# Patient Record
Sex: MALE | Race: BLACK OR AFRICAN AMERICAN
[De-identification: names, ages, dates, MRNs, and addresses within clinical notes are randomized per-mention and may not be internally consistent; named-entity substitution may affect disease eponyms.]

---

## 2021-10-02 ENCOUNTER — HOSPITAL ENCOUNTER (EMERGENCY)
Dept: HOSPITAL 61 - ER | Age: 36
Discharge: HOME | End: 2021-10-02
Payer: SELF-PAY

## 2021-10-02 VITALS — DIASTOLIC BLOOD PRESSURE: 104 MMHG | SYSTOLIC BLOOD PRESSURE: 171 MMHG

## 2021-10-02 VITALS — HEIGHT: 72 IN | WEIGHT: 315 LBS | BODY MASS INDEX: 42.66 KG/M2

## 2021-10-02 DIAGNOSIS — Z87.891: ICD-10-CM

## 2021-10-02 DIAGNOSIS — G89.11: ICD-10-CM
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
## 2021-10-02 DIAGNOSIS — M25.521: Primary | ICD-10-CM
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
## 2021-10-02 PROCEDURE — 99283 EMERGENCY DEPT VISIT LOW MDM: CPT

## 2021-10-02 PROCEDURE — 90715 TDAP VACCINE 7 YRS/> IM: CPT

## 2021-10-02 PROCEDURE — 99284 EMERGENCY DEPT VISIT MOD MDM: CPT

## 2021-10-02 PROCEDURE — 90471 IMMUNIZATION ADMIN: CPT

## 2021-10-02 PROCEDURE — 73090 X-RAY EXAM OF FOREARM: CPT

## 2021-10-02 PROCEDURE — 73060 X-RAY EXAM OF HUMERUS: CPT

## 2021-10-02 PROCEDURE — A4565 SLINGS: HCPCS

## 2021-10-02 NOTE — ED.ADGEN
Past Medical History


Past Medical History:  No Pertinent History


Past Surgical History:  Appendectomy


Smoking Status:  Former Smoker


Alcohol Use:  None


Drug Use:  None





General Adult


EDM:


Chief Complaint:  UPPER EXTREMITY INJURY





HPI:


HPI:





Patient is a 36  year old male coming in for right elbow pain after a fall.  J

ust prior to arrival patient was at a fast food restaurant when he slipped on 

the floor and fell backwards landing on his right elbow.  Patient is left-

handed.  No other injuries.  Otherwise has been well.  Complaining of tingling 

in his fingers on his right





Review of Systems:


Review of Systems:


All other systems within normal limits except for as noted in the HPI





Current Medications:





Current Medications








 Medications


  (Trade)  Dose


 Ordered  Sig/Marino  Start Time


 Stop Time Status Last Admin


Dose Admin


 


 Acetaminophen/


 Hydrocodone Bitart


  (Lortab 5/325)  1 tab  1X  ONCE  10/2/21 18:00


 10/2/21 18:01   





 


 Diphtheria/


 Tetanus/Acell


 Pertussis


  (ADACEL TDap


 SYRINGE)  0.5 ml  ONCE ONCE  10/2/21 18:00


 10/2/21 18:01   





 


 Ibuprofen


  (Motrin)  800 mg  1X  ONCE  10/2/21 17:30


 10/2/21 17:31 DC 10/2/21 17:34


800 MG











Allergies:


Allergies:





Allergies








Coded Allergies Type Severity Reaction Last Updated Verified


 


  No Known Drug Allergies    9/14/15 No











Physical Exam:


PE:





Constitutional: Well developed, well nourished, no acute distress, non-toxic 

appearance. []


HENT: Normocephalic, atraumatic, bilateral external ears normal, oropharynx 

moist, no oral exudates, nose normal. []


Eyes: PERRLA, EOMI, conjunctiva normal, no discharge. [] 


Neck: Normal range of motion, no tenderness, supple, no stridor. [] 


Cardiovascular:Heart rate regular rhythm, no murmur []


Lungs & Thorax:  Bilateral breath sounds clear to auscultation []


Abdomen: Bowel sounds normal, soft, no tenderness, no masses, no pulsatile 

masses. [] 


Skin: Warm, dry, no erythema, no rash. [] 


Back: No tenderness, no CVA tenderness. [] 


Extremities: No tenderness, no cyanosis, no clubbing, ROM intact, no edema. [] 


Neurologic: Alert and oriented X 3, normal motor function, normal sensory f

unction, no focal deficits noted. []


Psychologic: Affect normal, judgement normal, mood normal. []





Current Patient Data:


Vital Signs:





                                   Vital Signs








  Date Time  Temp Pulse Resp B/P (MAP) Pulse Ox O2 Delivery O2 Flow Rate FiO2


 


10/2/21 17:04 97.5  17 171/101 (124) 98 Room Air  





 97.5       











EKG:


EKG:


[]





Heart Score:


C/O Chest Pain:  No


Risk Factors:


Risk Factors:  DM, Current or recent (<one month) smoker, HTN, HLP, family 

history of CAD, obesity.


Risk Scores:


Score 0 - 3:  2.5% MACE over next 6 weeks - Discharge Home


Score 4 - 6:  20.3% MACE over next 6 weeks - Admit for Clinical Observation


Score 7 - 10:  72.7% MACE over next 6 weeks - Early Invasive Strategies





Radiology/Procedures:


Radiology/Procedures:


X-ray of right humerus and forearm EP interpretation: Small chip-like fracture 

of the right coronoid process.  []





Course & Med Decision Making:


Course & Med Decision Making


Pertinent Labs and Imaging studies reviewed. (See chart for details)


Given sling and strict follow-up, tetanus updated.


[]





Dragon Disclaimer:


Dragon Disclaimer:


This electronic medical record was generated, in whole or in part, using a voice

 recognition dictation system.





Departure


Departure


Impression:  


   Primary Impression:  


   Elbow fracture, right


Disposition:  01 HOME / SELF CARE / HOMELESS


Condition:  STABLE


Referrals:  


NO PCP (PCP)








MAKAYLA FORTUNE DO


Patient Instructions:  Arm Sling Use, Easy-to-Read





Additional Instructions:  


Follow-up with orthopedics, Dr. Fortune at:


Scripts


Hydrocodone Bit/Acetaminophen (HYDROCODONE-APAP 5-325  **) 1 Tab Tablet


1 TAB PO PRN Q6HRS PRN for PAIN for 3 Days, #10 TAB 0 Refills


   Prov: KRISHNA AGUIRRE MD         10/2/21











KRISHNA AGUIRRE MD             Oct 2, 2021 17:54

## 2021-10-02 NOTE — RAD
EXAMINATION: Right forearm and right humerus radiographs.



VIEWS: 2 views of the right humerus and 2 views of the right forearm.



COMPARISON: None



INDICATION:36 years, Male, pain, fall.



FINDINGS: 

No acute fracture, dislocation or subluxation. No bone erosion or periosteal reaction. Posterior olec
ranon enthesophyte. No soft tissue swelling or joint effusion.



IMPRESSION:

No acute osseous process in the right humerus or forearm.



Electronically signed by: Jerel Do MD (10/2/2021 6:20 PM) JASON

## 2022-02-15 ENCOUNTER — HOSPITAL ENCOUNTER (EMERGENCY)
Dept: HOSPITAL 61 - ER | Age: 37
Discharge: HOME | End: 2022-02-15
Payer: SELF-PAY

## 2022-02-15 VITALS — SYSTOLIC BLOOD PRESSURE: 157 MMHG | DIASTOLIC BLOOD PRESSURE: 94 MMHG

## 2022-02-15 VITALS — WEIGHT: 250.45 LBS | HEIGHT: 72 IN | BODY MASS INDEX: 33.92 KG/M2

## 2022-02-15 DIAGNOSIS — Z87.891: ICD-10-CM

## 2022-02-15 DIAGNOSIS — K46.9: Primary | ICD-10-CM

## 2022-02-15 LAB
ALBUMIN SERPL-MCNC: 3.2 G/DL (ref 3.4–5)
ALBUMIN/GLOB SERPL: 0.6 {RATIO} (ref 1–1.7)
ALP SERPL-CCNC: 97 U/L (ref 46–116)
ALT SERPL-CCNC: 29 U/L (ref 16–63)
ANION GAP SERPL CALC-SCNC: 7 MMOL/L (ref 6–14)
APTT PPP: YELLOW S
AST SERPL-CCNC: 23 U/L (ref 15–37)
BACTERIA #/AREA URNS HPF: 0 /HPF
BASOPHILS # BLD AUTO: 0.1 X10^3/UL (ref 0–0.2)
BASOPHILS NFR BLD: 1 % (ref 0–3)
BILIRUB SERPL-MCNC: 0.3 MG/DL (ref 0.2–1)
BILIRUB UR QL STRIP: NEGATIVE
BUN SERPL-MCNC: 8 MG/DL (ref 8–26)
BUN/CREAT SERPL: 7 (ref 6–20)
CALCIUM SERPL-MCNC: 8.5 MG/DL (ref 8.5–10.1)
CHLORIDE SERPL-SCNC: 107 MMOL/L (ref 98–107)
CO2 SERPL-SCNC: 28 MMOL/L (ref 21–32)
CREAT SERPL-MCNC: 1.1 MG/DL (ref 0.7–1.3)
EOSINOPHIL NFR BLD: 0.3 X10^3/UL (ref 0–0.7)
EOSINOPHIL NFR BLD: 3 % (ref 0–3)
ERYTHROCYTE [DISTWIDTH] IN BLOOD BY AUTOMATED COUNT: 16.8 % (ref 11.5–14.5)
FIBRINOGEN PPP-MCNC: CLEAR MG/DL
GFR SERPLBLD BASED ON 1.73 SQ M-ARVRAT: 91.6 ML/MIN
GLUCOSE SERPL-MCNC: 97 MG/DL (ref 70–99)
HCT VFR BLD CALC: 39.5 % (ref 39–53)
HGB BLD-MCNC: 12.6 G/DL (ref 13–17.5)
LYMPHOCYTES # BLD: 3.9 X10^3/UL (ref 1–4.8)
LYMPHOCYTES NFR BLD AUTO: 48 % (ref 24–48)
MCH RBC QN AUTO: 25 PG (ref 25–35)
MCHC RBC AUTO-ENTMCNC: 32 G/DL (ref 31–37)
MCV RBC AUTO: 77 FL (ref 79–100)
MONO #: 0.7 X10^3/UL (ref 0–1.1)
MONOCYTES NFR BLD: 9 % (ref 0–9)
NEUT #: 3.1 X10^3/UL (ref 1.8–7.7)
NEUTROPHILS NFR BLD AUTO: 38 % (ref 31–73)
NITRITE UR QL STRIP: NEGATIVE
PH UR STRIP: 5.5 [PH]
PLATELET # BLD AUTO: 292 X10^3/UL (ref 140–400)
POTASSIUM SERPL-SCNC: 3.9 MMOL/L (ref 3.5–5.1)
PROT SERPL-MCNC: 8.6 G/DL (ref 6.4–8.2)
PROT UR STRIP-MCNC: NEGATIVE MG/DL
RBC # BLD AUTO: 5.1 X10^6/UL (ref 4.3–5.7)
RBC #/AREA URNS HPF: 0 /HPF (ref 0–2)
SODIUM SERPL-SCNC: 142 MMOL/L (ref 136–145)
UROBILINOGEN UR-MCNC: 0.2 MG/DL
WBC # BLD AUTO: 8.1 X10^3/UL (ref 4–11)
WBC #/AREA URNS HPF: 0 /HPF (ref 0–4)

## 2022-02-15 PROCEDURE — 85025 COMPLETE CBC W/AUTO DIFF WBC: CPT

## 2022-02-15 PROCEDURE — 74177 CT ABD & PELVIS W/CONTRAST: CPT

## 2022-02-15 PROCEDURE — 36415 COLL VENOUS BLD VENIPUNCTURE: CPT

## 2022-02-15 PROCEDURE — 80053 COMPREHEN METABOLIC PANEL: CPT

## 2022-02-15 PROCEDURE — 81001 URINALYSIS AUTO W/SCOPE: CPT

## 2022-02-15 PROCEDURE — 99285 EMERGENCY DEPT VISIT HI MDM: CPT

## 2022-02-15 NOTE — PHYS DOC
Past Medical History


Past Medical History:  No Pertinent History


Past Surgical History:  No Surgical History


Smoking Status:  Former Smoker


Alcohol Use:  None


Drug Use:  None





General Adult


EDM:


Chief Complaint:  ABDOMINAL PAIN





HPI:


HPI:





Patient is a 36-year-old male presents to the emergency department complaining 

of a lump to his abdomen is painful to palpation. Patient reports this past 

Sunday while he was at a [employer] party having fun drinking and laughing, had 

a coughing spell in which she felt a slight mild discomfort to his mid abdomen 

area, states it did not seem worrisome at the time however when he woke up the 

next morning he felt a painful lump that did not seem to go away. Patient 

reports a constant 2 out of 10 pain to the left on his abdomen, reports it 

increases to a 10 out of 10 when pressed on or pushed on. Patient denies nausea,

vomiting, diarrhea, or constipation. Patient denies seeing blood in his stool or

in his urine. Patient denies increased urinary frequency, hematuria, difficulty 

urination, or other dysuria, patient denies chest pains, chest palpitations, 

shortness of breath, chest or nasal congestion. Patient denies dizziness, 

syncopal or near syncopal episodes. Patient denies other physical complaints or 

physical concerns. Patient reports she has not taken any medications for his 

abdominal discomfort. Patient reports he does not take prescription medications,

has no allergies, reports a past surgical history of an appendectomy in 2006.





Review of Systems:


Review of Systems:


14 body systems of review of systems have been reviewed.  See HPI for pertinent 

positives and negative responses, otherwise all other systems are negative, 

nonpertinent or noncontributory.


Constitutional: Negative except as outlined in HPI above.


Skin: Negative except as outlined in HPI above.


Eyes:   Negative except as outlined in HPI above.


HENT: Negative except as outlined in HPI above.


Respiratory:   Negative except as outlined in HPI above.


Cardiovascular:   Negative except as outlined in HPI above.


GI:   Negative except as outlined in HPI above.


:  Negative except as outlined in HPI above.


Musculoskeletal:   Negative except as outlined in HPI above.


Integument:   Negative except as outlined in HPI above.


Neurologic:   Negative except as outlined in HPI above.


Endocrine:   Negative except as outlined in HPI above.


Lymphatic:  Negative except as outlined in HPI above.


Psychiatric:  Negative except as outlined in HPI above.





Heart Score:


C/O Chest Pain:  No


Risk Factors:


Risk Factors:  DM, Current or recent (<one month) smoker, HTN, HLP, family 

history of CAD, obesity.


Risk Scores:


Score 0 - 3:  2.5% MACE over next 6 weeks - Discharge Home


Score 4 - 6:  20.3% MACE over next 6 weeks - Admit for Clinical Observation


Score 7 - 10:  72.7% MACE over next 6 weeks - Early Invasive Strategies





Current Medications:





Current Medications








 Medications


  (Trade)  Dose


 Ordered  Sig/Marino  Start Time


 Stop Time Status Last Admin


Dose Admin


 


 Iohexol


  (Omnipaque 300


 Mg/ml)  75 ml  1X  ONCE  2/15/22 11:15


 2/15/22 11:16 DC 2/15/22 11:13


75 ML











Allergies:


Allergies:





Allergies








Coded Allergies Type Severity Reaction Last Updated Verified


 


  No Known Drug Allergies    2/15/22 No











Physical Exam:


PE:





Constitutional: Well developed, well nourished, no acute distress, non-toxic 

appearance. 36-year-old male in no apparent distress.


HENT: Normocephalic, atraumatic. Oropharynx moist, pink, no lymphadenopathy of 

the head or neck appreciated. Bilateral TMs intact and within normal limits.


Eyes: Conjunctiva normal, no discharge.


Neck: Normal range of motion, no stridor.


Cardiovascular: No cyanosis appreciated, distal cap refill less than 2 seconds. 

Heart sounds S1-S2, regular rate and rhythm.


Lungs & Thorax: Patient is in no respiratory distress, no audible adventitious 

lung sounds appreciated. Lung sounds are clear to auscultation all lung fields.


Abdomen: Abdomen round, patient morbidly obese, BMI 34.0, old well-healed 

surgical scars present, there is a abdominal mass appreciated midline just s

uperior to umbilicus, none reducible to palpation, palpation did elicit a pain 

response, bowel sounds normal all 4 quadrants, there is no rebound tenderness, 

no Ibarra sign, no psoas sign, negative McBurney's point tenderness, there is no

skin discoloration present to the abdomen.


Skin: Warm, dry, no erythema, no rash.  


Back: No tenderness, no deformities.


Extremities: No tenderness, no cyanosis, no clubbing, ROM intact, no edema.  


Neurologic: Alert and oriented X 3, normal motor function, normal sensory 

function, no focal deficits noted. 


Psychologic: Affect normal, judgement normal, mood normal.





Current Patient Data:


Labs:





                                Laboratory Tests








Test


 2/15/22


10:42


 


White Blood Count


 8.1 x10^3/uL


(4.0-11.0)


 


Red Blood Count


 5.10 x10^6/uL


(4.30-5.70)


 


Hemoglobin


 12.6 g/dL


(13.0-17.5)  L


 


Hematocrit


 39.5 %


(39.0-53.0)


 


Mean Corpuscular Volume


 77 fL ()


L


 


Mean Corpuscular Hemoglobin 25 pg (25-35)  


 


Mean Corpuscular Hemoglobin


Concent 32 g/dL


(31-37)


 


Red Cell Distribution Width


 16.8 %


(11.5-14.5)  H


 


Platelet Count


 292 x10^3/uL


(140-400)


 


Neutrophils (%) (Auto) 38 % (31-73)  


 


Lymphocytes (%) (Auto) 48 % (24-48)  


 


Monocytes (%) (Auto) 9 % (0-9)  


 


Eosinophils (%) (Auto) 3 % (0-3)  


 


Basophils (%) (Auto) 1 % (0-3)  


 


Neutrophils # (Auto)


 3.1 x10^3/uL


(1.8-7.7)


 


Lymphocytes # (Auto)


 3.9 x10^3/uL


(1.0-4.8)


 


Monocytes # (Auto)


 0.7 x10^3/uL


(0.0-1.1)


 


Eosinophils # (Auto)


 0.3 x10^3/uL


(0.0-0.7)


 


Basophils # (Auto)


 0.1 x10^3/uL


(0.0-0.2)


 


Urine Collection Type Unknown  


 


Urine Color Yellow  


 


Urine Clarity Clear  


 


Urine pH


 5.5 (<5.0-8.0)





 


Urine Specific Gravity


 >=1.030


(1.000-1.030)


 


Urine Protein


 Negative mg/dL


(NEG-TRACE)


 


Urine Glucose (UA)


 Negative mg/dL


(NEG)


 


Urine Ketones (Stick)


 Negative mg/dL


(NEG)


 


Urine Blood


 Negative (NEG)





 


Urine Nitrite


 Negative (NEG)





 


Urine Bilirubin


 Negative (NEG)





 


Urine Urobilinogen Dipstick


 0.2 mg/dL (0.2


mg/dL)


 


Urine Leukocyte Esterase


 Negative (NEG)





 


Urine RBC 0 /HPF (0-2)  


 


Urine WBC 0 /HPF (0-4)  


 


Urine Squamous Epithelial


Cells Few /LPF  





 


Urine Bacteria


 0 /HPF (0-FEW)





 


Urine Mucus Marked /LPF  


 


Sodium Level


 142 mmol/L


(136-145)


 


Potassium Level


 3.9 mmol/L


(3.5-5.1)


 


Chloride Level


 107 mmol/L


()


 


Carbon Dioxide Level


 28 mmol/L


(21-32)


 


Anion Gap 7 (6-14)  


 


Blood Urea Nitrogen


 8 mg/dL (8-26)





 


Creatinine


 1.1 mg/dL


(0.7-1.3)


 


Estimated GFR


(Cockcroft-Gault) 91.6  





 


BUN/Creatinine Ratio 7 (6-20)  


 


Glucose Level


 97 mg/dL


(70-99)


 


Calcium Level


 8.5 mg/dL


(8.5-10.1)


 


Total Bilirubin


 0.3 mg/dL


(0.2-1.0)


 


Aspartate Amino Transferase


(AST) 23 U/L (15-37)





 


Alanine Aminotransferase (ALT)


 29 U/L (16-63)





 


Alkaline Phosphatase


 97 U/L


()


 


Total Protein


 8.6 g/dL


(6.4-8.2)  H


 


Albumin


 3.2 g/dL


(3.4-5.0)  L


 


Albumin/Globulin Ratio


 0.6 (1.0-1.7)


L





                                Laboratory Tests


2/15/22 10:42








                                Laboratory Tests


2/15/22 10:42








Vital Signs:





                                   Vital Signs








  Date Time  Temp Pulse Resp B/P (MAP) Pulse Ox O2 Delivery O2 Flow Rate FiO2


 


2/15/22 09:35 98.6 73 16 157/94 (115) 100   





 98.6       











Radiology/Procedures:


Radiology/Procedures:


REASON: Midline abdominal mass superior to umbilicus, hernia evaluation


PROCEDURE: CT ABD PELV W/ IV CONTRST ONLY





Examination: CT of the abdomen pelvis with IV contrast





HISTORY: History of the midline of abdominal mass





COMPARISON: None available 





Technique: Axial CT images of the abdomen pelvis were performed with IV 

contrast. Coronal and sagittal reformats are performed.





 Exposure: One or more of the following individualized dose reduction techniques

were utilized for this examination:  1. Automated exposure control  2. 

Adjustment of the mA and/or kV according to patient size  3. Use of iterative 

reconstruction technique





FINDINGS:





The bibasilar lungs are clear. No evidence of free air identified in the 

abdomen.





The liver, spleen, adrenals grossly appears unremarkable. The gallbladder is 

mildly distended. The stomach is mildly distended. The pancreas grossly appears 

unremarkable. Small bowel is nondilated. Feces and gas noted in the colon. 

Urinary bladder is mildly distended.





There is a anterior abdominal wall hernia just to the right of the midline above

the umbilicus measuring 5.0 x 3.2 cm containing omentum revision with 

surrounding mild fat stranding underlying omental infarct is not excluded the 

neck of the hernia is narrow measuring 7 mm in transverse dimension.





The bilateral kidneys enhance symmetrically. No evidence of hydronephrosis





Mild degenerative changes lumbar spine and bilateral hip joints. Small subc

hondral cystic changes identified in the left acetabulum.





IMPRESSION:





Anterior abdominal wall hernia just to the right of the midline above the 

umbilicus measuring 5.0 x 3.2 cm containing omentum within with surrounding mild

fat stranding, underlying omental infarct is not excluded . The neck of the 

hernia is narrow measuring 7 mm in transverse dimension.





Electronically signed by: Vitaliy Fontenot MD (2/15/2022 11:49 AM) [id number]





Course & Med Decision Making:


Course & Med Decision Making


Pertinent Labs and Imaging studies reviewed. (See chart for details)





36-year-old male, vital signs reviewed, presents to the emergency department 

concerning painful lump on abdomen after coughing this past Sunday. Physical 

examination concerning for abdominal hernia, was unable to reduce during 

physical examination, will order CBC, CMP, IV saline lock, CT with IV contrast 

abdomen and pelvis to evaluate for hernia versus other abdominal dyscrasia. 

Patient is amenable to ED planning.





Patient's lab work unremarkable, CT abdomen pelvis reveals fat-containing 

abdominal hernia, there is a low likelihood of strangulation, patient is not in 

acute distress, currently denies abdominal discomfort, there is no nausea 

vomiting, patient is afebrile, vital signs within normal limits.





Discussed findings with patient, recommended follow-up with general surgeon for 

evaluation and treatment of abdominal hernia, follow-up with primary care for 

ongoing symptoms, return to ER precautions or concerns were reviewed, patient 

gave verbal understanding of and is amenable to ED discharge planning.





Discussed with the patient all findings and diagnostic testing as well as the 

need to follow-up with their primary care provider for further evaluation and 

treatment or return to the ED if any new or worsening symptoms.  Strict return 

precautions were also discussed at length, the patient voiced understanding and 

agreement with the discharge planning.  The patient was nontoxic in appearance, 

in no apparent distress, and hemodynamically stable at the time of disposition.





Dragon Disclaimer:


Dragon Disclaimer:


This electronic medical record was generated, in whole or in part, using a voice

recognition dictation system.





Departure


Departure


Impression:  


   Primary Impression:  


   Abdominal hernia


   Qualified Codes:  K46.9 - Unspecified abdominal hernia without obstruction or

   gangrene


Disposition:  01 HOME / SELF CARE / HOMELESS


Condition:  GOOD


Referrals:  


NO PCP (PCP)








MICHELLE VALLADARES MD


Patient Instructions:  Hernia





Additional Instructions:  


You were seen today for a lump on your abdomen, a CT scan revealed a hernia.  As

we discussed, please follow-up with a general surgeon to have this evaluated and

repaired.  You may see the recommended general surgeon Dr. Valladares or any 

general surgeon of your choice.  Please return to the emergency department for 

fever or chills, increased severe abdominal discomfort, nausea and vomiting, 

blood in your stool or vomitus, or other concerns.  Thank you for visiting our 

Emergency Department.  It was a pleasure taking care of you today in the 

emergency department and we appreciate you trusting us with your care. If any 

additional problems come up don't hesitate to return to visit us. Please follow 

up with your primary care provider so they can plan additional care if needed 

and know about the problem that you had. If symptoms worsen come back to the 

Emergency Department. Any concerning symptoms that start such as chest pain, 

shortness of air, weakness or numbness on one side of the body, running high 

fevers or any other concerning symptoms return to the ER.





EMERGENCY DEPARTMENT GENERAL DISCHARGE INSTRUCTIONS





Thank you for coming to [facility] Emergency Department (ED) tod

ay and 


trusting us with you care.  We trust that you had a positive experience in our 

Emergency 


Department.  If you wish to speak to the department management, you may call the

Director at 


[phone].





YOUR FOLLOW UP INSTRUCTIONS ARE AS FOLLOWS:





1.  Do you have a private Doctor?  If you do not have a private doctor, please 

ask for a 


resource list of physicians or clinics that may be able to assist you with 

follow up care.





2.  The Emergency Physicain has interpreted your x-rays.  The X-Ray specialist 

will also 


review them.  If there is a change in the findings, you will be notified in 48 

hours when at 


all possible.





3.  A lab test or culture has been done, your results will be reviewed and you 

will be 


notified if you need a change in treatment.





ADDITIONAL INSTRUCTIONS AND INFORMATION:





1.  Your care today has been supervised by a physician who is specially trained 

in emergency 


care.  Many problems require more than one evaluation for a complete diagnosis 

and 


treatment.  We recommend that you schedule your follow up appointment as 

recommended to 


ensure complete treatment of you illness or injury.  If you are unable to obtain

follow up 


care and continue to have a problem, or if your condition worsens, we recommend 

that you 


return to the ED.





2.  We are not able to safely determine your condition over the phone nor are we

able to 


give sound medical advice over the phone.  For these safety reasons, if you call

for medical 


advice we will ask you to come to the ED for further evaluation.





3.  If you have any questions regarding these discharge instructions please call

the ED at 


[phone].





SAFETY INFORMATION:





In the interest of safety, wellness, and injury prevention; we encourage you to 

wear your 


sealbelt, if you smoke; quite smoking, and we encourage family to use a 

protective helmet 


for bicycling and other sporting events that present an increased risk for head 

injury.





IF YOUR SYMPTOMS WORSEN OR NEW SYMPTOMS DEVELOP, OR YOU HAVE CONCERNS ABOUT YOUR

CONDITION; 


OR IF YOUR CONDITION WORSENS WHILE YOU ARE WAITING FOR YOUR FOLLOW UP APPOINTM

ENT; EITHER 


CONTACT YOUR PRIMARY CARE DOCTOR, THE PHYSICIAN WHOSE NAME AND NUMBER YOU WERE 

GIVEN, OR 


RETURN TO THE ED IMMEDIATELY.











PORFIRIO CORONADO       Feb 15, 2022 11:30

## 2022-02-15 NOTE — RAD
Examination: CT of the abdomen pelvis with IV contrast



HISTORY: History of the midline of abdominal mass



COMPARISON: None available 



Technique: Axial CT images of the abdomen pelvis were performed with IV contrast. Coronal and sagitta
l reformats are performed.



 Exposure: One or more of the following individualized dose reduction techniques were utilized for th
is examination:  1. Automated exposure control  2. Adjustment of the mA and/or kV according to patien
t size  3. Use of iterative reconstruction technique



FINDINGS:



The bibasilar lungs are clear. No evidence of free air identified in the abdomen.



The liver, spleen, adrenals grossly appears unremarkable. The gallbladder is mildly distended. The st
omach is mildly distended. The pancreas grossly appears unremarkable. Small bowel is nondilated. Fece
s and gas noted in the colon. Urinary bladder is mildly distended.



There is a anterior abdominal wall hernia just to the right of the midline above the umbilicus measur
ing 5.0 x 3.2 cm containing omentum revision with surrounding mild fat stranding underlying omental i
nfarct is not excluded the neck of the hernia is narrow measuring 7 mm in transverse dimension.



The bilateral kidneys enhance symmetrically. No evidence of hydronephrosis



Mild degenerative changes lumbar spine and bilateral hip joints. Small subchondral cystic changes virgilio
ntified in the left acetabulum.



IMPRESSION:



Anterior abdominal wall hernia just to the right of the midline above the umbilicus measuring 5.0 x 3
.2 cm containing omentum within with surrounding mild fat stranding, underlying omental infarct is no
t excluded . The neck of the hernia is narrow measuring 7 mm in transverse dimension.



Electronically signed by: Vitaliy Fontenot MD (2/15/2022 11:49 AM) [id number]